# Patient Record
Sex: MALE | Race: WHITE | NOT HISPANIC OR LATINO
[De-identification: names, ages, dates, MRNs, and addresses within clinical notes are randomized per-mention and may not be internally consistent; named-entity substitution may affect disease eponyms.]

---

## 2022-06-26 PROBLEM — Z00.00 ENCOUNTER FOR PREVENTIVE HEALTH EXAMINATION: Status: ACTIVE | Noted: 2022-06-26

## 2022-06-27 ENCOUNTER — APPOINTMENT (OUTPATIENT)
Dept: RADIOLOGY | Facility: CLINIC | Age: 62
End: 2022-06-27

## 2022-06-27 ENCOUNTER — OUTPATIENT (OUTPATIENT)
Dept: OUTPATIENT SERVICES | Facility: HOSPITAL | Age: 62
LOS: 1 days | End: 2022-06-27

## 2022-06-27 DIAGNOSIS — Z00.8 ENCOUNTER FOR OTHER GENERAL EXAMINATION: ICD-10-CM

## 2023-07-10 ENCOUNTER — APPOINTMENT (OUTPATIENT)
Dept: ORTHOPEDIC SURGERY | Facility: CLINIC | Age: 63
End: 2023-07-10
Payer: COMMERCIAL

## 2023-07-10 VITALS
DIASTOLIC BLOOD PRESSURE: 105 MMHG | WEIGHT: 180 LBS | HEIGHT: 64 IN | SYSTOLIC BLOOD PRESSURE: 144 MMHG | BODY MASS INDEX: 30.73 KG/M2 | HEART RATE: 81 BPM

## 2023-07-10 DIAGNOSIS — Z78.9 OTHER SPECIFIED HEALTH STATUS: ICD-10-CM

## 2023-07-10 DIAGNOSIS — Z87.09 PERSONAL HISTORY OF OTHER DISEASES OF THE RESPIRATORY SYSTEM: ICD-10-CM

## 2023-07-10 DIAGNOSIS — F17.200 NICOTINE DEPENDENCE, UNSPECIFIED, UNCOMPLICATED: ICD-10-CM

## 2023-07-10 PROCEDURE — 73560 X-RAY EXAM OF KNEE 1 OR 2: CPT | Mod: 50

## 2023-07-10 PROCEDURE — 99203 OFFICE O/P NEW LOW 30 MIN: CPT | Mod: 25

## 2023-07-10 PROCEDURE — 20610 DRAIN/INJ JOINT/BURSA W/O US: CPT | Mod: LT

## 2023-07-12 PROBLEM — Z78.9 CURRENT NON-SMOKER: Status: ACTIVE | Noted: 2023-07-12

## 2023-07-12 PROBLEM — Z78.9 CONSUMES ALCOHOL OCCASIONALLY: Status: ACTIVE | Noted: 2023-07-12

## 2023-07-12 PROBLEM — F17.200 CURRENT SMOKER: Status: ACTIVE | Noted: 2023-07-12

## 2023-07-12 PROBLEM — Z78.9 NEVER EXERCISES: Status: ACTIVE | Noted: 2023-07-12

## 2023-07-12 PROBLEM — Z87.09 HISTORY OF ASTHMA: Status: RESOLVED | Noted: 2023-07-12 | Resolved: 2023-07-12

## 2023-07-12 NOTE — DISCUSSION/SUMMARY
[de-identified] : At this time, due to osteoarthritis of the bilateral knees (left is worse than the right), I recommend an injection, as noted above. I recommend ice and elevation. He will be reassessed in three to four weeks.

## 2023-07-12 NOTE — REVIEW OF SYSTEMS
[Joint Pain] : joint pain [Joint Stiffness] : joint stiffness [Joint Swelling] : joint swelling [Feeling Weak] : feeling weak [Muscle Weakness] : muscle weakness [Negative] : Heme/Lymph

## 2023-07-12 NOTE — ADDENDUM
[FreeTextEntry1] : This note was written by Dina López on 07/12/2023 acting as a scribe for AGNES FARRIS III, MD

## 2023-07-12 NOTE — PHYSICAL EXAM
[de-identified] : Right Knee: \par Range of Motion in Degrees	\par 	                  Claimant:	Normal:	\par Flexion Active	  135 	                135-degrees	\par Flexion Passive	  135	                135-degrees	\par Extension Active	  0-5	                0-5-degrees	\par Extension Passive	  0-5	                0-5-degrees	\par \par No weakness to flexion/extension.  No evidence of instability in the AP plane or varus or valgus stress.  Negative  Lachman.  Negative pivot shift.  Negative anterior drawer test.  Negative posterior drawer test.  Negative Javi.  Negative Apley grind.  No medial or lateral joint line tenderness.  Positive tenderness over the medial and lateral facet of the patella.  Positive patellofemoral crepitations.  No lateral tilting patella.  No patella apprehension.  Positive crepitation in the medial and lateral femoral condyle.  No proximal or distal swelling, edema or tenderness.  No gross motor or sensory deficits.  Mild intra-articular swelling.  2+ DP and PT pulses.  No varus or valgus malalignment.  Skin is intact.  No rashes, scars or lesions. \par \par Left Knee: \par Range of Motion in Degrees	\par 	                  Claimant:	Normal:	\par Flexion Active	  135 	                135-degrees	\par Flexion Passive	  135	                135-degrees	\par Extension Active	  0-5	                0-5-degrees	\par Extension Passive	  0-5	                0-5-degrees	\par \par No weakness to flexion/extension.  No evidence of instability in the AP plane or varus or valgus stress.  Negative  Lachman.  Negative pivot shift.  Negative anterior drawer test.  Negative posterior drawer test.  Negative Javi.  Negative Apley grind.  No medial or lateral joint line tenderness.  Positive tenderness over the medial and lateral facet of the patella.  Positive patellofemoral crepitations.  No lateral tilting patella.  No patella apprehension.  Positive crepitation in the medial and lateral femoral condyle.  No proximal or distal swelling, edema or tenderness.  No gross motor or sensory deficits.  Mild intra-articular swelling.  2+ DP and PT pulses.  No varus or valgus malalignment.  Skin is intact.  No rashes, scars or lesions. \par  [de-identified] : Ambulating with a slightly antalgic to antalgic gait.  Station:  Normal.  [de-identified] : Appearance:  Well-developed, well-nourished male in no acute distress.\par   [de-identified] : Radiographs, which were taken in the office today, one-two views of the right knee and one-two views of the left knee, including the pelvis, show moderate degenerative changes.

## 2023-07-12 NOTE — HISTORY OF PRESENT ILLNESS
[de-identified] : The patient comes in today with complaints of pain in his bilateral knees. He states it has been going on for sometime and it is getting worse recently. This injury is not work related or due to an automobile accident. The patient states the pain is constant. The patient describes the pain as dull. The patient notes medication makes his symptoms better, while walking makes his symptoms worse.  [5] : the ailment interference is 5/10 [10  (interferes completely)] : the ailment interference is10/10 (interferes completely) [(Does not interfere) 0] : the ailment interference is 0/10 (does not interfere) [7] : the ailment interference is 7/10 [] : No

## 2023-07-12 NOTE — PROCEDURE
[de-identified] : Indication:  Osteoarthritis of the Left Knee\par \par Consent: \par At this time, I have recommended an injection to the left knee.  The risks and benefits of the procedure were discussed with the patient in detail.  Upon verbal consent of the patient, we proceeded with the injection as noted below.  \par \par Procedure:  \par After a sterile prep, the patient underwent an injection of 9 cc of 1% Lidocaine (10mg/mL) without epinephrine and 1 cc of Kenalog (40mg/mL) into the left knee.  The patient tolerated the procedure well.  There were no complications.  \par \par : [employer]par Drug name:     Triamcinolone Acetonide Injectable Suspension USP\par NDC #:            0452-7571-16\par Lot #:              [id number]\par Expiration:      01/2024

## 2023-07-26 ENCOUNTER — APPOINTMENT (OUTPATIENT)
Dept: ORTHOPEDIC SURGERY | Facility: CLINIC | Age: 63
End: 2023-07-26
Payer: COMMERCIAL

## 2023-07-26 VITALS
SYSTOLIC BLOOD PRESSURE: 139 MMHG | HEART RATE: 75 BPM | DIASTOLIC BLOOD PRESSURE: 98 MMHG | HEIGHT: 64 IN | WEIGHT: 180 LBS | BODY MASS INDEX: 30.73 KG/M2

## 2023-07-26 PROCEDURE — 99213 OFFICE O/P EST LOW 20 MIN: CPT

## 2023-07-26 PROCEDURE — 73564 X-RAY EXAM KNEE 4 OR MORE: CPT | Mod: 50

## 2023-07-26 RX ORDER — HYALURONATE SODIUM 20 MG/2 ML
20 SYRINGE (ML) INTRAARTICULAR
Qty: 2 | Refills: 0 | Status: ACTIVE | OUTPATIENT
Start: 2023-07-26

## 2023-07-26 NOTE — PHYSICAL EXAM
[de-identified] : GENERAL APPEARANCE: Well nourished and hydrated, pleasant, alert, and oriented x 3. Appears their stated age. \par HEENT: Normocephalic, extraocular eye motion intact. Nasal septum midline. Oral cavity clear. External auditory canal clear. \par RESPIRATORY: Breath sounds clear and audible in all lobes. No wheezing, No accessory muscle use.\par CARDIOVASCULAR: No apparent abnormalities. No lower leg edema. No varicosities. Pedal pulses are palpable.\par NEUROLOGIC: Sensation is normal, no muscle weakness in the upper or lower extremities.\par DERMATOLOGIC: No apparent skin lesions, moist, warm, no rash.\par SPINE: Cervical spine appears normal and moves freely; thoracic spine appears normal and moves freely; lumbosacral spine appears normal and moves freely, normal, nontender.\par MUSCULOSKELETAL: Hands, wrists, and elbows are normal and move freely, shoulders are normal and move freely. \par PSYCHIATRIC: Oriented to person, place, and time, insight and judgement were intact and the affect was normal. [de-identified] : Right knee exam shows mild effusion, ROM is 0-125 degrees, no instability, negative Javi, no joint line tenderness.\par Left knee exam shows mild effusion, ROM is 0-125 degrees, no instability, negative Javi, medial joint line tenderness.\par 5/5 motor strength in bilateral lower extremities. Sensory: Intact in bilateral lower extremities. DTRs: Biceps, brachioradialis, triceps, patellar, ankle and plantar 2+ and symmetric bilaterally. Pulses: dorsalis pedis, posterior tibial, femoral, popliteal, and radial 2+ and symmetric bilaterally. [de-identified] : 4 views of bilateral knees obtained the office today show no acute fracture or dislocation.  For the right knee there is moderate to severe medial joint space narrowing most pronounced on the Burrows view consistent with Kellgren-Jam grade 3 changes.  Patellofemoral lateral compartments appear unaffected.  For the left knee there is moderate to severe medial joint space narrowing osteophyte formation consistent with Kellgren-Jam grade 3 changes.  Patellofemoral and lateral compartments appear unaffected

## 2023-07-26 NOTE — HISTORY OF PRESENT ILLNESS
[Pain Location] : pain [] : right & left knee [Worsening] : worsening [___ yrs] : [unfilled] year(s) ago [Constant] : ~He/She~ states the symptoms seem to be constant [Sitting] : worsened by sitting [Walking] : worsened by walking [Acetaminophen] : relieved by acetaminophen [NSAIDs] : relieved by nonsteroidal anti-inflammatory drugs [de-identified] : 63 year old male patient presents today for an initial consultation for bilateral knee pain (left knee pain is worse than the right knee pain). The patient states the pain has been on going for a while. The patient works as a Manager at [employer]. He states that the pain in his knees are effecting him being able to do his job and his overall quality of life. The patient received a cortisone injection in his knee 07/10/2023 with Dr. Blue in Sports Medicine, but states the injection offered only 1-2 weeks of pain relief before wearing off. The patient had left knee meniscus surgery through [facility] in the Winter of 2018. The patient has been using Tylenol for the pain, but is taking a break so he pereyra not harm his liver. He is now using Voltaren gel for the pain and states that it helps at night time. He has not had any surgery on the right knee. 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 The patient states there is a grinding and clicking sensation in his knees. [de-identified] : going up and down the stairs, rising from a seated position

## 2023-07-26 NOTE — ADDENDUM
[FreeTextEntry1] : This note was written by Ashley Willoughby, acting as the  for Dr. Hernandez. This note accurately reflects the work and decisions made by Dr. Hernandez.

## 2023-07-26 NOTE — DISCUSSION/SUMMARY
[Other: ____] : in [unfilled] [Medication Risks Reviewed] : Medication risks reviewed [Surgical risks reviewed] : Surgical risks reviewed [de-identified] : Patient is a 63-year-old male with moderate to severe bilateral knee osteoarthritis most pronounced in the medial compartment presenting today for initial evaluation.  He had a cortisone injection a few weeks ago however provided only minimal and temporary relief.  He has not yet exhausted conservative treatment and would like to try viscosupplementation.  I have ordered that for him.  I recommend he continue with low impact activity and exercises.  I given a prescription for physical therapy.  He will continue to use Voltaren gel as he is concerned about the effects of NSAIDs on his liver.  I will see him back after the viscosupplementation for repeat evaluation and management.  All questions were asked and answered.  He was advised may be candidate for a unicompartmental versus total knee arthroplasty in the future

## 2023-09-11 ENCOUNTER — APPOINTMENT (OUTPATIENT)
Dept: ORTHOPEDIC SURGERY | Facility: CLINIC | Age: 63
End: 2023-09-11
Payer: COMMERCIAL

## 2023-09-11 VITALS — BODY MASS INDEX: 30.73 KG/M2 | WEIGHT: 180 LBS | HEIGHT: 64 IN

## 2023-09-11 PROCEDURE — 20610 DRAIN/INJ JOINT/BURSA W/O US: CPT | Mod: 50

## 2023-09-18 ENCOUNTER — APPOINTMENT (OUTPATIENT)
Dept: ORTHOPEDIC SURGERY | Facility: CLINIC | Age: 63
End: 2023-09-18

## 2023-09-18 ENCOUNTER — APPOINTMENT (OUTPATIENT)
Dept: ORTHOPEDIC SURGERY | Facility: CLINIC | Age: 63
End: 2023-09-18
Payer: COMMERCIAL

## 2023-09-18 PROCEDURE — 20610 DRAIN/INJ JOINT/BURSA W/O US: CPT | Mod: 50

## 2023-09-26 ENCOUNTER — APPOINTMENT (OUTPATIENT)
Dept: ORTHOPEDIC SURGERY | Facility: CLINIC | Age: 63
End: 2023-09-26
Payer: COMMERCIAL

## 2023-09-26 VITALS
HEIGHT: 64 IN | SYSTOLIC BLOOD PRESSURE: 105 MMHG | BODY MASS INDEX: 30.73 KG/M2 | WEIGHT: 180 LBS | HEART RATE: 78 BPM | DIASTOLIC BLOOD PRESSURE: 64 MMHG

## 2023-09-26 PROCEDURE — 20610 DRAIN/INJ JOINT/BURSA W/O US: CPT | Mod: 50

## 2024-05-20 ENCOUNTER — APPOINTMENT (OUTPATIENT)
Dept: ORTHOPEDIC SURGERY | Facility: CLINIC | Age: 64
End: 2024-05-20
Payer: COMMERCIAL

## 2024-05-20 VITALS
BODY MASS INDEX: 29.02 KG/M2 | HEIGHT: 64 IN | SYSTOLIC BLOOD PRESSURE: 123 MMHG | DIASTOLIC BLOOD PRESSURE: 76 MMHG | WEIGHT: 170 LBS | HEART RATE: 73 BPM

## 2024-05-20 DIAGNOSIS — Z78.9 OTHER SPECIFIED HEALTH STATUS: ICD-10-CM

## 2024-05-20 DIAGNOSIS — M17.0 BILATERAL PRIMARY OSTEOARTHRITIS OF KNEE: ICD-10-CM

## 2024-05-20 PROCEDURE — 20610 DRAIN/INJ JOINT/BURSA W/O US: CPT | Mod: 50

## 2024-05-20 PROCEDURE — 99214 OFFICE O/P EST MOD 30 MIN: CPT | Mod: 25

## 2024-05-20 NOTE — PHYSICAL EXAM
[Normal] : Gait: normal [de-identified] : Right knee exam shows mild effusion, ROM is 0-125 degrees, no instability, negative Javi, no joint line tenderness.\par  Left knee exam shows mild effusion, ROM is 0-125 degrees, no instability, negative Javi, medial joint line tenderness.\par  5/5 motor strength in bilateral lower extremities. Sensory: Intact in bilateral lower extremities. DTRs: Biceps, brachioradialis, triceps, patellar, ankle and plantar 2+ and symmetric bilaterally. Pulses: dorsalis pedis, posterior tibial, femoral, popliteal, and radial 2+ and symmetric bilaterally.

## 2024-05-20 NOTE — PROCEDURE
[de-identified] : I injected his left knee. I discussed at length with the patient the planned steroid and lidocaine injection. The risks, benefits, convalescence and alternatives were reviewed. The possible side effects discussed included but were not limited to: pain, swelling, heat, bleeding, and redness. Symptoms are generally mild but if they are extensive then contact the office. Giving pain relievers by mouth such as NSAIDs or Tylenol can generally treat the reactions to steroid and lidocaine. Rare cases of infection have been noted. Rash, hives and itching may occur post injection. If you have muscle pain or cramps, flushing and or swelling of the face, rapid heart beat, nausea, dizziness, fever, chills, headache, difficulty breathing, swelling in the arms or legs, or have a prickly feeling of your skin, contact a health care provider immediately. Following this discussion, the knee was prepped with Alcohol and under sterile condition the 80 mg Depo-Medrol and 6 cc Lidocaine injection was performed with a 20 gauge needle through a superolateral injection site. The needle was introduced into the joint, aspiration was performed to ensure intra-articular placement and the medication was injected. Upon withdrawal of the needle the site was cleaned with alcohol and a band aid applied. The patient tolerated the injection well and there were no adverse effects. Post injection instructions included no strenuous activity for 24 hours, cryotherapy and if there are any adverse effects to contact the office. I injected his right knee. I discussed at length with the patient the planned steroid and lidocaine injection. The risks, benefits, convalescence and alternatives were reviewed. The possible side effects discussed included but were not limited to: pain, swelling, heat, bleeding, and redness. Symptoms are generally mild but if they are extensive then contact the office. Giving pain relievers by mouth such as NSAIDs or Tylenol can generally treat the reactions to steroid and lidocaine. Rare cases of infection have been noted. Rash, hives and itching may occur post injection. If you have muscle pain or cramps, flushing and or swelling of the face, rapid heart beat, nausea, dizziness, fever, chills, headache, difficulty breathing, swelling in the arms or legs, or have a prickly feeling of your skin, contact a health care provider immediately. Following this discussion, the knee was prepped with Alcohol and under sterile condition the 80 mg Depo-Medrol and 6 cc Lidocaine injection was performed with a 20 gauge needle through a superolateral injection site. The needle was introduced into the joint, aspiration was performed to ensure intra-articular placement and the medication was injected. Upon withdrawal of the needle the site was cleaned with alcohol and a band aid applied. The patient tolerated the injection well and there were no adverse effects. Post injection instructions included no strenuous activity for 24 hours, cryotherapy and if there are any adverse effects to contact the office.

## 2024-05-20 NOTE — ADDENDUM
[FreeTextEntry1] : This note was written by Arianna Gallegos, acting as the  for Dr. Hernandez. This note accurately reflects the work and decisions made by Dr. Hernandez.

## 2024-05-20 NOTE — DISCUSSION/SUMMARY
[Medication Risks Reviewed] : Medication risks reviewed [Surgical risks reviewed] : Surgical risks reviewed [6 Weeks] : in 6 weeks [de-identified] : Patient is a 63-year-old male with severe medial compartment bilateral knee osteoarthritis presenting today for follow-up.  We have thorough discussion about conservative surgical treatment options.  At this time he is not yet ready proceed with any type of surgical intervention.  I gave him injection of cortisone in the bilateral knees which he tolerated well.  Discussed low impact activity and exercise.  Given another prescription physical therapy.  I will see him back in 6 weeks for repeat evaluation.  If no improvement in symptoms we will further discuss partial versus total knee arthroplasty at that time.  All questions were asked and answered

## 2024-05-20 NOTE — HISTORY OF PRESENT ILLNESS
[Pain Location] : pain [] : right & left knee [Worsening] : worsening [___ mths] : [unfilled] month(s) ago [Walking] : walking [Sitting] : sitting [Standing] : standing [Lifting] : lifting [Constant] : ~He/She~ states the symptoms seem to be constant [Acetaminophen] : relieved by acetaminophen [NSAIDs] : relieved by nonsteroidal anti-inflammatory drugs [Rest] : relieved by rest [de-identified] : 63 year old male presents today for follow-up of his bilateral knee pain after 3 Euflexxa gel injections. He states he felt relief for about 2 weeks until pain worsened again. He did not go to physical therapy yet. No swelling. Patient experiences pain going up and down stairs and rising from a seated position. His left knee hurts more than the right, specifically in the medial aspect. He has had Cortisone injections and Voltaren in the past to very little relief of pain. 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 He states he feels clicking occasionally to the knees. The patient denies any falls or trauma and has been using conservative treatment. He has had meniscus repair surgery to the left knee in the past. No physical therapy used for relief of pain. No constitutional symptoms noted. [Physical Therapy] : not relieved by physical therapy [de-identified] : going up and down stairs and rising from a seated position. [de-identified] : Euflexxa injections, Voltaren, Cortisone injections.

## 2024-06-24 ENCOUNTER — APPOINTMENT (OUTPATIENT)
Dept: ORTHOPEDIC SURGERY | Facility: CLINIC | Age: 64
End: 2024-06-24